# Patient Record
Sex: FEMALE
[De-identification: names, ages, dates, MRNs, and addresses within clinical notes are randomized per-mention and may not be internally consistent; named-entity substitution may affect disease eponyms.]

---

## 2020-08-17 ENCOUNTER — HOSPITAL ENCOUNTER (EMERGENCY)
Dept: HOSPITAL 88 - ER | Age: 35
Discharge: HOME | End: 2020-08-17
Payer: SELF-PAY

## 2020-08-17 VITALS — HEIGHT: 64 IN | WEIGHT: 130 LBS | BODY MASS INDEX: 22.2 KG/M2

## 2020-08-17 VITALS — DIASTOLIC BLOOD PRESSURE: 78 MMHG | SYSTOLIC BLOOD PRESSURE: 98 MMHG

## 2020-08-17 DIAGNOSIS — N39.0: ICD-10-CM

## 2020-08-17 DIAGNOSIS — R05: ICD-10-CM

## 2020-08-17 DIAGNOSIS — R06.02: ICD-10-CM

## 2020-08-17 DIAGNOSIS — U07.1: Primary | ICD-10-CM

## 2020-08-17 LAB
BACTERIA URNS QL MICRO: (no result) /HPF
BILIRUB UR QL: NEGATIVE
CLARITY UR: (no result)
COLOR UR: YELLOW
DEPRECATED RBC URNS MANUAL-ACNC: >50 /HPF (ref 0–5)
EPI CELLS URNS QL MICRO: (no result) /LPF
HCG UR QL: NEGATIVE
KETONES UR QL STRIP.AUTO: NEGATIVE
LEUKOCYTE ESTERASE UR QL STRIP.AUTO: (no result)
NITRITE UR QL STRIP.AUTO: NEGATIVE
NON-SQ EPI CELLS URNS QL MICRO: (no result)
PROT UR QL STRIP.AUTO: (no result)
RENAL EPI CELLS URNS QL MICRO: (no result)
SP GR UR STRIP: 1.01 (ref 1.01–1.02)
UROBILINOGEN UR STRIP-MCNC: 2 MG/DL (ref 0.2–1)
WBC #/AREA URNS HPF: (no result) /HPF (ref 0–5)

## 2020-08-17 PROCEDURE — 99283 EMERGENCY DEPT VISIT LOW MDM: CPT

## 2020-08-17 PROCEDURE — 81001 URINALYSIS AUTO W/SCOPE: CPT

## 2020-08-17 PROCEDURE — 81025 URINE PREGNANCY TEST: CPT

## 2020-08-17 PROCEDURE — 71045 X-RAY EXAM CHEST 1 VIEW: CPT

## 2020-08-17 NOTE — EMERGENCY DEPARTMENT NOTE
History of Present Illnes


History of Present Illness


Chief Complaint:  COVID PUI


History of Present Illness


This is a 35 year old  female  WHO TESTED POSITIVE FOR COVID 19 ON AUGUST 6TH 

PRESENTS WITH C/P FEVER, CHILLS, COUGH, SOB, BODY ACHES FOR PAST 8 DAYS, STATES 

TODAY DEVELOPED BURNING WITH URINATION.





.


Historian:  Patient


Arrival Mode:  Car


Onset (how long ago):  day(s) (8)


Location:  ALL OVER


Quality:  BODY ACHES, BURNING WITH URINATION, COUGH, FEVER, CHILLS


Radiation:  Reports non-radiation


Severity:  moderate


Onset quality:  gradual


Duration (how long):  day(s) (8)


Timing of current episode:  constant


Progression:  unchanged


Chronicity:  new


Context:  Reports recent illness (POSITIVE FOR COVID 19)


Relieving factors:  none


Exacerbating factors:  none


Associated symptoms:  Reports cough, Reports diaphoresis, Reports fever/chills, 

Reports shortness of breath, Reports weakness, Reports other (BURNING WITH 

URINATION)


Treatments prior to arrival:  antipyretic





Past Medical/Family History


Physician Review


I have reviewed the patient's past medical and family history.  Any updates have

been documented here.





Past Medical History


Recent Fever:  Yes


Clinical Suspicion of Infectio:  Yes


New/Unexplained Change in Ment:  No


Past Medical History:  None


Past Surgical History:  Cholecysctectomy





Social History


Smoking Cessation:  Never Smoker


Counseling Performed:  No


Alcohol Use:  None


Any Illegal Drug Use:  No


Physically hurt or threatened:  No





Family History


Family history of heart diseas:  No





Other


Any Pre-Existing Lines (PICC,:  No





Review of Systems


Review of Systems


EENTM:  Reports no symptoms


Cardiovascular:  Reports no symptoms


Respiratory:  Reports as per HPI


Gastrointestinal:  Reports no symptoms


Genitourinary:  Reports as per HPI


Musculoskeletal:  Reports no symptoms


Integumentary:  Reports no symptoms


Neurological:  Reports no symptoms


Psychological:  Reports no symptoms


Endocrine:  Reports no symptoms


Hematological/Lymphatic:  Reports no symptoms





Physical Exam


Related Data


Allergies:  


Coded Allergies:  


     No Known Allergies (Unverified , 8/17/20)


Triage Vital Signs





Vital Signs








  Date Time  Temp Pulse Resp B/P (MAP) Pulse Ox O2 Delivery O2 Flow Rate FiO2


 


8/17/20 18:38 98.6 92 18 133/107 100 Room Air  








Vital signs reviewed:  Yes





Physical Exam


CONSTITUTIONAL





Constitutional:  Present well-developed, Present well-nourished; 


   Absent distressed


HENT


HENT:  Present normocephalic, Present atraumatic, Present oropharynx 

clear/moist, Present nose normal


HENT L/R:  Present left ext ear normal, Present right ext ear normal


EYES





Eyes:  Reports PERRL, Reports conjunctivae normal


NECK


Neck:  Present ROM normal


PULMONARY


Pulmonary:  Present effort normal, Present breath sounds normal


CARDIOVASCULAR





Cardiovascular:  Present regular rhythm, Present heart sounds normal, Present 

capillary refill normal, Present normal rate


GASTROINTESTINAL





Abdominal:  Present soft, Present nontender, Present bowel sounds normal


GENITOURINARY





Genitourinary:  Present exam deferred


SKIN


Skin:  Present warm, Present dry


MUSCULOSKELETAL





Musculoskeletal:  Present ROM normal


NEUROLOGICAL





Neurological:  Present alert, Present oriented x 3, Present no gross motor or 

sensory deficits


PSYCHOLOGICAL


Psychological:  Present mood/affect normal, Present judgement normal





Results


Laboratory


Laboratory





Laboratory Tests








Test


 8/17/20


18:42


 


Urine Color


 Yellow


(YELLOW)


 


Urine Clarity


 Sl cloudy


(CLEAR)


 


Urine pH 6.5 (5 - 7) 


 


Urine Specific Gravity


 1.015


(1.010-1.025)


 


Urine Protein


 Trace


(NEGATIVE)


 


Urine Glucose (UA)


 Negative


(NEGATIVE)


 


Urine Ketones


 Negative


(NEGATIVE)


 


Urine Blood 4+ (NEGATIVE) 


 


Urine Nitrite


 Negative


(NEGATIVE)


 


Urine Bilirubin


 Negative


(NEGATIVE)


 


Urine Urobilinogen


 2.0 mg/dL (0.2


- 1)


 


Urine Leukocyte Esterase 1+ (NEGATIVE) 


 


Urine RBC >50 /HPF (0-5) 


 


Urine WBC


 21-50 /HPF


(0-5)


 


Urine Epithelial Cells


 Moderate /LPF


(NONE)


 


Urine Transitional Epithelial


Cells Few (NONE) 





 


Urine Renal Epithelial Cells Few (NONE) 


 


Urine Bacteria


 Many /HPF


(NONE)


 


Urine Pregnancy Test


 Negative


(NEGATIVE)








Lab results reviewed:  Yes





Imaging


Imaging results reviewed:  Yes


Impressions


Procedure: [id number] DX/CHEST SINGLE (PORTABLE)


Exam Date:                             Exam Time: 








                              REPORT STATUS: Signed





EXAMINATION:  CHEST SINGLE (PORTABLE)   





COMPARISON:  None





INDICATION:  


^COVID POSITIVE, SOB


^Y   





DISCUSSION:


Frontal view of the chest obtained at 1951 hours.





HEART AND MEDIASTINUM:  The cardiomediastinal silhouette is unremarkable.


  


LINES:  None.





LUNGS/PLEURA:  The lungs are well inflated and clear. No pneumonia or pulmonary


edema. No pleural effusion or pneumothorax.





BONES AND SOFT TISSUES:  No focal osseous lesion. The soft tissues are normal.








IMPRESSION: 


No acute cardiopulmonary disease.





Signed by: Dr. Fabrizio Kelley MD on 8/17/2020 8:30 PM








Dictated By: FABRIZIO KELLEY MD


Electronically Signed By: FABRIZIO KELLEY MD on 08/17/20 2030


Transcribed By: VERONIQUE on 08/17/20 2030 








COPY TO:   KITTY ZAMORANO MD~





Assessment & Plan


Medical Decision Making


MDM


PT WITH COVID 19 WITH MULTIPLE RELATED SYMPTOMS AND DYSURIA





CXR ORDERED TO EVAL FOR PNEUMONIA


UA ORDERED TO EVAL FOR UTI





pt with uti


rocephin 1 gram im ordered





pt discharged with omnicef 300 mg po bid for 10 days #20, zofran 4 mg odt 1 sl q

6 hours prn nause #30





Assessment & Plan


Final Impression:  


(1) COVID-19


(2) UTI (urinary tract infection)


Depart Disposition:  HOME, SELF-CARE


Last Vital Signs











  Date Time  Temp Pulse Resp B/P (MAP) Pulse Ox O2 Delivery O2 Flow Rate FiO2


 


8/17/20 18:38 98.6 92 18 133/107 100 Room Air  

















KITTY ZAMORANO MD        Aug 17, 2020 18:52

## 2020-08-17 NOTE — DIAGNOSTIC IMAGING REPORT
EXAMINATION:  CHEST SINGLE (PORTABLE)   



COMPARISON:  None



INDICATION:  

^COVID POSITIVE, SOB

^Y   



DISCUSSION:

Frontal view of the chest obtained at 1951 hours.



HEART AND MEDIASTINUM:  The cardiomediastinal silhouette is unremarkable.

  

LINES:  None.



LUNGS/PLEURA:  The lungs are well inflated and clear. No pneumonia or pulmonary

edema. No pleural effusion or pneumothorax.



BONES AND SOFT TISSUES:  No focal osseous lesion. The soft tissues are normal.





IMPRESSION: 

No acute cardiopulmonary disease.



Signed by: Dr. Janay Kelley MD on 8/17/2020 8:30 PM